# Patient Record
Sex: MALE | Race: ASIAN | NOT HISPANIC OR LATINO | URBAN - METROPOLITAN AREA
[De-identification: names, ages, dates, MRNs, and addresses within clinical notes are randomized per-mention and may not be internally consistent; named-entity substitution may affect disease eponyms.]

---

## 2019-08-28 ENCOUNTER — OUTPATIENT (OUTPATIENT)
Dept: OUTPATIENT SERVICES | Facility: HOSPITAL | Age: 54
LOS: 1 days | End: 2019-08-28
Payer: MEDICAID

## 2019-08-28 VITALS
DIASTOLIC BLOOD PRESSURE: 71 MMHG | WEIGHT: 143.3 LBS | HEART RATE: 64 BPM | OXYGEN SATURATION: 100 % | SYSTOLIC BLOOD PRESSURE: 107 MMHG | RESPIRATION RATE: 18 BRPM | HEIGHT: 68.5 IN | TEMPERATURE: 99 F

## 2019-08-28 DIAGNOSIS — M23.303 OTHER MENISCUS DERANGEMENTS, UNSPECIFIED MEDIAL MENISCUS, RIGHT KNEE: ICD-10-CM

## 2019-08-28 DIAGNOSIS — M23.306 OTHER MENISCUS DERANGEMENTS, UNSPECIFIED MENISCUS, RIGHT KNEE: ICD-10-CM

## 2019-08-28 DIAGNOSIS — F17.200 NICOTINE DEPENDENCE, UNSPECIFIED, UNCOMPLICATED: ICD-10-CM

## 2019-08-28 DIAGNOSIS — M67.461 GANGLION, RIGHT KNEE: ICD-10-CM

## 2019-08-28 DIAGNOSIS — M67.40 GANGLION, UNSPECIFIED SITE: ICD-10-CM

## 2019-08-28 DIAGNOSIS — Z01.818 ENCOUNTER FOR OTHER PREPROCEDURAL EXAMINATION: ICD-10-CM

## 2019-08-28 PROCEDURE — G0463: CPT

## 2019-08-28 RX ORDER — SODIUM CHLORIDE 9 MG/ML
3 INJECTION INTRAMUSCULAR; INTRAVENOUS; SUBCUTANEOUS EVERY 8 HOURS
Refills: 0 | Status: DISCONTINUED | OUTPATIENT
Start: 2019-08-30 | End: 2019-09-14

## 2019-08-28 NOTE — H&P PST ADULT - NSICDXPASTMEDICALHX_GEN_ALL_CORE_FT
PAST MEDICAL HISTORY:  Derangement of lateral meniscus of right knee     Derangement of medial meniscus of right knee     Ganglion of right knee     Nicotine dependence

## 2019-08-28 NOTE — H&P PST ADULT - HISTORY OF PRESENT ILLNESS
54 yr old Chinese male PMH of  presents with c/o right knee pain due to torn meniscus and ganglion cyst . Pt reports worsening of pain with prolonged ambulation. Pt for right knee arthroscopy and meniscectomy, open ganglion cyst removal on 8/30/2019.

## 2019-08-28 NOTE — H&P PST ADULT - GASTROINTESTINAL DETAILS
soft/nontender/no distention/normal/no rebound tenderness/no masses palpable/bowel sounds normal/no bruit/no guarding

## 2019-08-28 NOTE — H&P PST ADULT - RS GEN PE MLT RESP DETAILS PC
no wheezes/normal/respirations non-labored/clear to auscultation bilaterally/no subcutaneous emphysema/no rales/good air movement/no intercostal retractions/airway patent/breath sounds equal/no chest wall tenderness

## 2019-08-28 NOTE — H&P PST ADULT - ASSESSMENT
54 yr old Chinese male PMH of  nicotine dependence presents with right knee pain due to torn meniscus and ganglion cyst . Pt for right knee arthroscopy and meniscectomy, open ganglion cyst removal on 8/30/2019.

## 2019-08-28 NOTE — H&P PST ADULT - ATTENDING COMMENTS
Discussed with patient.    Right knee parameniscal cyst.    Plan for open resection (may do arthroscopy decompression / resection if possible).    Risks: infection, recurrence, tumor possibility, recurrence, et ct.    Pt signed consent.

## 2019-08-28 NOTE — H&P PST ADULT - NEGATIVE CARDIOVASCULAR SYMPTOMS
no dyspnea on exertion/no chest pain/no orthopnea/no peripheral edema/no palpitations/no paroxysmal nocturnal dyspnea/no claudication

## 2019-08-28 NOTE — H&P PST ADULT - NSICDXFAMILYHX_GEN_ALL_CORE_FT
FAMILY HISTORY:  Family history of liver cancer FAMILY HISTORY:  Family history of cerebral hemorrhage, father  Family history of liver cancer, mother

## 2019-08-28 NOTE — H&P PST ADULT - NEGATIVE GASTROINTESTINAL SYMPTOMS
no abdominal pain/no flatulence/no nausea/no constipation/no change in bowel habits/no melena/no diarrhea/no vomiting

## 2019-08-28 NOTE — H&P PST ADULT - NSICDXPROBLEM_GEN_ALL_CORE_FT
PROBLEM DIAGNOSES  Problem: Other derangement of meniscus of right knee  Assessment and Plan: right knee arthroscopy and meniscectomy on 8/30/2019. Preoperative instructions given via Chinese speaking  Salud ID# [id number]. Verbalized understanding of instructions given.    Problem: Ganglion cyst  Assessment and Plan: Open ganglion cyst removal on 8/30/2019.    Problem: Nicotine dependence  Assessment and Plan: Smoking cessation encouraged. Instructed to discuss with PCP.

## 2019-08-28 NOTE — H&P PST ADULT - NSANTHOSAYNRD_GEN_A_CORE
No. SHERRILL screening performed.  STOP BANG Legend: 0-2 = LOW Risk; 3-4 = INTERMEDIATE Risk; 5-8 = HIGH Risk

## 2019-08-30 ENCOUNTER — OUTPATIENT (OUTPATIENT)
Dept: OUTPATIENT SERVICES | Facility: HOSPITAL | Age: 54
LOS: 1 days | End: 2019-08-30
Payer: MEDICAID

## 2019-08-30 VITALS
OXYGEN SATURATION: 98 % | RESPIRATION RATE: 16 BRPM | TEMPERATURE: 98 F | HEART RATE: 82 BPM | WEIGHT: 143.3 LBS | HEIGHT: 68.5 IN | SYSTOLIC BLOOD PRESSURE: 92 MMHG | DIASTOLIC BLOOD PRESSURE: 64 MMHG

## 2019-08-30 VITALS
TEMPERATURE: 98 F | DIASTOLIC BLOOD PRESSURE: 66 MMHG | HEART RATE: 73 BPM | SYSTOLIC BLOOD PRESSURE: 100 MMHG | OXYGEN SATURATION: 96 % | RESPIRATION RATE: 16 BRPM

## 2019-08-30 DIAGNOSIS — M23.300 OTHER MENISCUS DERANGEMENTS, UNSPECIFIED LATERAL MENISCUS, RIGHT KNEE: ICD-10-CM

## 2019-08-30 DIAGNOSIS — M23.303 OTHER MENISCUS DERANGEMENTS, UNSPECIFIED MEDIAL MENISCUS, RIGHT KNEE: ICD-10-CM

## 2019-08-30 DIAGNOSIS — M67.461 GANGLION, RIGHT KNEE: ICD-10-CM

## 2019-08-30 PROCEDURE — 27347 REMOVE KNEE CYST: CPT | Mod: XS,RT

## 2019-08-30 PROCEDURE — 88305 TISSUE EXAM BY PATHOLOGIST: CPT | Mod: 26

## 2019-08-30 PROCEDURE — 29881 ARTHRS KNE SRG MNISECTMY M/L: CPT | Mod: RT

## 2019-08-30 PROCEDURE — 88305 TISSUE EXAM BY PATHOLOGIST: CPT

## 2019-08-30 RX ORDER — OXYCODONE AND ACETAMINOPHEN 5; 325 MG/1; MG/1
1 TABLET ORAL EVERY 4 HOURS
Refills: 0 | Status: DISCONTINUED | OUTPATIENT
Start: 2019-08-30 | End: 2019-08-30

## 2019-08-30 RX ORDER — SODIUM CHLORIDE 9 MG/ML
1000 INJECTION, SOLUTION INTRAVENOUS
Refills: 0 | Status: DISCONTINUED | OUTPATIENT
Start: 2019-08-30 | End: 2019-08-30

## 2019-08-30 RX ORDER — HYDROMORPHONE HYDROCHLORIDE 2 MG/ML
0.5 INJECTION INTRAMUSCULAR; INTRAVENOUS; SUBCUTANEOUS
Refills: 0 | Status: DISCONTINUED | OUTPATIENT
Start: 2019-08-30 | End: 2019-08-30

## 2019-08-30 RX ORDER — ACETAMINOPHEN 500 MG
975 TABLET ORAL ONCE
Refills: 0 | Status: COMPLETED | OUTPATIENT
Start: 2019-08-30 | End: 2019-08-30

## 2019-08-30 RX ORDER — CELECOXIB 200 MG/1
200 CAPSULE ORAL ONCE
Refills: 0 | Status: COMPLETED | OUTPATIENT
Start: 2019-08-30 | End: 2019-08-30

## 2019-08-30 RX ADMIN — OXYCODONE AND ACETAMINOPHEN 1 TABLET(S): 5; 325 TABLET ORAL at 16:14

## 2019-08-30 RX ADMIN — SODIUM CHLORIDE 3 MILLILITER(S): 9 INJECTION INTRAMUSCULAR; INTRAVENOUS; SUBCUTANEOUS at 12:33

## 2019-08-30 RX ADMIN — OXYCODONE AND ACETAMINOPHEN 1 TABLET(S): 5; 325 TABLET ORAL at 15:44

## 2019-08-30 NOTE — PHYSICAL THERAPY INITIAL EVALUATION ADULT - DIAGNOSIS, PT EVAL
s/p Excision of ganglion cyst of right knee; Right knee arthroscopy with meniscectomy; gait disturbance; antalgic gait; mild difficulty walking

## 2019-08-30 NOTE — ASU PATIENT PROFILE, ADULT - PMH
Derangement of lateral meniscus of right knee    Derangement of medial meniscus of right knee    Ganglion of right knee    Nicotine dependence

## 2019-08-30 NOTE — ASU DISCHARGE PLAN (ADULT/PEDIATRIC) - CARE PROVIDER_API CALL
Estefany You)  Orthopaedic Surgery  [address] [place] [address], [address]  [place] [address]  Phone: [phone]  Fax: [phone]  Follow Up Time: 1 week

## 2019-08-30 NOTE — PHYSICAL THERAPY INITIAL EVALUATION ADULT - GENERAL OBSERVATIONS, REHAB EVAL
awake, alert, NAD; +peripheral IV access on anterior aspect of left forearm; +wound dressing and ACE wrap on right knee

## 2019-08-30 NOTE — BRIEF OPERATIVE NOTE - NSICDXBRIEFPOSTOP_GEN_ALL_CORE_FT
POST-OP DIAGNOSIS:  Degenerative tear of medial meniscus, right 30-Aug-2019 14:18:52  Estefany You  Meniscal cyst, right 30-Aug-2019 14:18:33  Estefany You

## 2019-08-30 NOTE — ASU DISCHARGE PLAN (ADULT/PEDIATRIC) - ACTIVITY LEVEL
Weight bearing as tolerated/Elevate extremity No heavy lifting/No sports/gym/Elevate extremity/Weight bearing as tolerated

## 2019-08-30 NOTE — ASU DISCHARGE PLAN (ADULT/PEDIATRIC) - CALL YOUR DOCTOR IF YOU HAVE ANY OF THE FOLLOWING:
Pain not relieved by Medications/Numbness, tingling, color or temperature change to extremity/Fever greater than (need to indicate Fahrenheit or Celsius)/Swelling that gets worse

## 2019-08-30 NOTE — BRIEF OPERATIVE NOTE - NSICDXBRIEFPROCEDURE_GEN_ALL_CORE_FT
PROCEDURES:  Excision of ganglion cyst of right knee 30-Aug-2019 13:28:58  Estefany You  Right knee arthroscopy with meniscectomy 30-Aug-2019 13:28:38  Estefany You

## 2019-08-30 NOTE — BRIEF OPERATIVE NOTE - NSICDXBRIEFPREOP_GEN_ALL_CORE_FT
PRE-OP DIAGNOSIS:  Meniscal cyst, right 30-Aug-2019 13:29:48  Estefany You PRE-OP DIAGNOSIS:  Degenerative tear of medial meniscus, right 30-Aug-2019 14:18:04  Estefany You  Meniscal cyst, right 30-Aug-2019 13:29:48  Estefany You

## 2024-11-05 NOTE — PHYSICAL THERAPY INITIAL EVALUATION ADULT - SITTING BALANCE: STATIC
Comment: Discussed Hx and etiology with patient, advised usually resolves in 6 months after the cause. Discussed gentle hair washing. Advised no treatment is necessary. Discuused follow up in 6 months if not resolved. Detail Level: Simple Render Risk Assessment In Note?: no normal balance

## 2024-12-31 NOTE — ASU PATIENT PROFILE, ADULT - TEACHING/LEARNING FACTORS INFLUENCE READINESS TO LEARN
"Subjective:       Patient ID: Heraclio Myers is a 72 y.o. male.    Chief Complaint: Post-op Evaluation  Established patient with PVD previous Right fem-pop Two week follow-up amputation 3rd and 4th digits right foot for osteomyelitis to complete a trans met amputation previous left trans met amputation  Past medical history diabetes hypertension  family history includes Diabetes in his brother and father.  Past Medical History:   Diagnosis Date    Diabetes mellitus type I     Hypertension       Past Surgical History:   Procedure Laterality Date    ANGIOGRAPHY OF LOWER EXTREMITY Bilateral 10/24/2023    Procedure: Angiogram Extremity;  Surgeon: Fauzia Davis MD;  Location: [facility], [place];  Service: General;  Laterality: Bilateral;    CORONARY ARTERY BYPASS GRAFT      PLACEMENT-STENT      TOE AMPUTATION Left     toes       reports that he has been smoking cigarettes. He has never used smokeless tobacco. He reports that he does not drink alcohol and does not use drugs.   HPI  Review of Systems      Objective:      Ht 5' 10" (1.778 m)   Wt 112 kg (246 lb 14.6 oz)   BMI 35.43 kg/m²    Physical Exam  Vitals and nursing note reviewed.   Constitutional:       Appearance: Normal appearance.   HENT:      Head: Normocephalic.      Mouth/Throat:      Mouth: Mucous membranes are moist.   Eyes:      Conjunctiva/sclera: Conjunctivae normal.   Cardiovascular:      Rate and Rhythm: Normal rate and regular rhythm.   Pulmonary:      Effort: Pulmonary effort is normal.   Abdominal:      Palpations: Abdomen is soft.   Musculoskeletal:      Comments: Previous toe amputations left foot    Healed right great toe amputation site    Amputation site wound clean with bloody drainage no cellulitis no necrotic tissue no purulent drainage       Skin:     General: Skin is warm and dry.   Neurological:      Mental Status: He is alert and oriented to person, place, and time.   Psychiatric:         Mood and Affect: Mood normal.           Assessment: "       1. Postop check        Plan:       Continue daily wet-to-dry dressing changes with Vashe  Follow-up in 2 weeks       none